# Patient Record
Sex: FEMALE
[De-identification: names, ages, dates, MRNs, and addresses within clinical notes are randomized per-mention and may not be internally consistent; named-entity substitution may affect disease eponyms.]

---

## 2021-11-02 PROBLEM — Z00.00 ENCOUNTER FOR PREVENTIVE HEALTH EXAMINATION: Status: ACTIVE | Noted: 2021-11-02

## 2021-12-14 ENCOUNTER — APPOINTMENT (OUTPATIENT)
Dept: ENDOCRINOLOGY | Facility: CLINIC | Age: 61
End: 2021-12-14
Payer: COMMERCIAL

## 2021-12-14 ENCOUNTER — TRANSCRIPTION ENCOUNTER (OUTPATIENT)
Age: 61
End: 2021-12-14

## 2021-12-14 VITALS
SYSTOLIC BLOOD PRESSURE: 120 MMHG | DIASTOLIC BLOOD PRESSURE: 70 MMHG | HEIGHT: 61 IN | BODY MASS INDEX: 30.21 KG/M2 | WEIGHT: 160 LBS | HEART RATE: 74 BPM

## 2021-12-14 DIAGNOSIS — Z78.9 OTHER SPECIFIED HEALTH STATUS: ICD-10-CM

## 2021-12-14 DIAGNOSIS — Z80.9 FAMILY HISTORY OF MALIGNANT NEOPLASM, UNSPECIFIED: ICD-10-CM

## 2021-12-14 DIAGNOSIS — Z86.39 PERSONAL HISTORY OF OTHER ENDOCRINE, NUTRITIONAL AND METABOLIC DISEASE: ICD-10-CM

## 2021-12-14 DIAGNOSIS — Z87.39 PERSONAL HISTORY OF OTHER DISEASES OF THE MUSCULOSKELETAL SYSTEM AND CONNECTIVE TISSUE: ICD-10-CM

## 2021-12-14 DIAGNOSIS — Z63.4 DISAPPEARANCE AND DEATH OF FAMILY MEMBER: ICD-10-CM

## 2021-12-14 DIAGNOSIS — Z85.3 PERSONAL HISTORY OF MALIGNANT NEOPLASM OF BREAST: ICD-10-CM

## 2021-12-14 PROCEDURE — 99204 OFFICE O/P NEW MOD 45 MIN: CPT

## 2021-12-14 RX ORDER — DENOSUMAB 60 MG/ML
60 INJECTION SUBCUTANEOUS
Refills: 0 | Status: ACTIVE | COMMUNITY

## 2021-12-14 SDOH — SOCIAL STABILITY - SOCIAL INSECURITY: DISSAPEARANCE AND DEATH OF FAMILY MEMBER: Z63.4

## 2022-12-14 ENCOUNTER — APPOINTMENT (OUTPATIENT)
Dept: ENDOCRINOLOGY | Facility: CLINIC | Age: 62
End: 2022-12-14

## 2023-01-18 ENCOUNTER — RX RENEWAL (OUTPATIENT)
Age: 63
End: 2023-01-18

## 2023-04-12 ENCOUNTER — APPOINTMENT (OUTPATIENT)
Dept: ENDOCRINOLOGY | Facility: CLINIC | Age: 63
End: 2023-04-12
Payer: COMMERCIAL

## 2023-04-12 VITALS
SYSTOLIC BLOOD PRESSURE: 118 MMHG | DIASTOLIC BLOOD PRESSURE: 76 MMHG | HEIGHT: 61 IN | WEIGHT: 163 LBS | BODY MASS INDEX: 30.78 KG/M2 | HEART RATE: 79 BPM | OXYGEN SATURATION: 98 %

## 2023-04-12 PROCEDURE — 99214 OFFICE O/P EST MOD 30 MIN: CPT | Mod: 25

## 2023-04-12 PROCEDURE — 36415 COLL VENOUS BLD VENIPUNCTURE: CPT

## 2023-04-14 LAB
25(OH)D3 SERPL-MCNC: 37.2 NG/ML
ALBUMIN SERPL ELPH-MCNC: 4.5 G/DL
ALP BLD-CCNC: 59 U/L
ALT SERPL-CCNC: 11 U/L
ANION GAP SERPL CALC-SCNC: 11 MMOL/L
AST SERPL-CCNC: 16 U/L
BILIRUB SERPL-MCNC: 0.2 MG/DL
BUN SERPL-MCNC: 17 MG/DL
CALCIUM SERPL-MCNC: 9.6 MG/DL
CALCIUM SERPL-MCNC: 9.6 MG/DL
CHLORIDE SERPL-SCNC: 100 MMOL/L
CO2 SERPL-SCNC: 25 MMOL/L
CREAT SERPL-MCNC: 0.77 MG/DL
EGFR: 87 ML/MIN/1.73M2
GLUCOSE SERPL-MCNC: 103 MG/DL
PARATHYROID HORMONE INTACT: 66 PG/ML
POTASSIUM SERPL-SCNC: 4.3 MMOL/L
PROT SERPL-MCNC: 7 G/DL
SODIUM SERPL-SCNC: 136 MMOL/L
T4 FREE SERPL-MCNC: 1.8 NG/DL
TSH SERPL-ACNC: 1.29 UIU/ML

## 2023-11-01 ENCOUNTER — NON-APPOINTMENT (OUTPATIENT)
Age: 63
End: 2023-11-01

## 2024-04-14 ENCOUNTER — RX RENEWAL (OUTPATIENT)
Age: 64
End: 2024-04-14

## 2024-04-17 ENCOUNTER — APPOINTMENT (OUTPATIENT)
Dept: ENDOCRINOLOGY | Facility: CLINIC | Age: 64
End: 2024-04-17
Payer: COMMERCIAL

## 2024-04-17 VITALS
OXYGEN SATURATION: 98 % | HEIGHT: 61 IN | SYSTOLIC BLOOD PRESSURE: 110 MMHG | HEART RATE: 94 BPM | BODY MASS INDEX: 32.1 KG/M2 | DIASTOLIC BLOOD PRESSURE: 60 MMHG | WEIGHT: 170 LBS

## 2024-04-17 DIAGNOSIS — Z13.1 ENCOUNTER FOR SCREENING FOR DIABETES MELLITUS: ICD-10-CM

## 2024-04-17 DIAGNOSIS — E55.9 VITAMIN D DEFICIENCY, UNSPECIFIED: ICD-10-CM

## 2024-04-17 DIAGNOSIS — M81.0 AGE-RELATED OSTEOPOROSIS W/OUT CURRENT PATHOLOGICAL FRACTURE: ICD-10-CM

## 2024-04-17 DIAGNOSIS — R73.01 IMPAIRED FASTING GLUCOSE: ICD-10-CM

## 2024-04-17 DIAGNOSIS — E03.9 HYPOTHYROIDISM, UNSPECIFIED: ICD-10-CM

## 2024-04-17 DIAGNOSIS — E21.3 HYPERPARATHYROIDISM, UNSPECIFIED: ICD-10-CM

## 2024-04-17 LAB
HBA1C MFR BLD HPLC: 5
TSH SERPL-ACNC: 0.61

## 2024-04-17 PROCEDURE — 99214 OFFICE O/P EST MOD 30 MIN: CPT

## 2024-04-17 RX ORDER — LEVOTHYROXINE SODIUM 0.12 MG/1
125 TABLET ORAL
Qty: 90 | Refills: 3 | Status: ACTIVE | COMMUNITY
Start: 2023-01-18 | End: 1900-01-01

## 2024-04-17 NOTE — HISTORY OF PRESENT ILLNESS
[FreeTextEntry1] : here for hypothyroidism, osteoporosis left breast cancer s/p lumpectomy and XRT (no chemo) 2003 but now in remission  thyroid hx: 2003 thyroid ultrasound: 1/2021  no nodules FH of thyroid issue: maternal aunt FH of thyroid cancer: none FH of diabetes: father external beam radiation: to left breast  menopause: 53yo  interval history feels okay labs reviewd- pth 66/ normal calcium, vit d 37 has 4 grandkids, 1.4 yo and 1.6yo and 3 and 6yo 5th granddaughter coming in 2 weeks going to [employer] this sunday with 3 kids and oldest  meds for thyroid: LT4 125mcg  takes appropriately, no missing doses  on prolia since 2019, Dr. Scott Jack, rheumatologist no falls or fractures had b/l hip replacements taking calcium/vitamin d gummies mother and gma both osteoporosis

## 2024-04-17 NOTE — ASSESSMENT
[FreeTextEntry1] : 63F w/ hx of left breast cancer s/p lumpectomy and XRT, hypothyroidism, IFG and osteoporosis/ vitamin d def here for follow up rtc in 1 year with labs pmd Dr. Ian Masters pth slightly elevated possibly related with the vitamin d   Hypothyroidism- TFTs normal. continue LT4 125mcg daily.  Osteoporosis, vitamin d def- on prolia since 2019. reported that bmd improved. seeing rheumatologist. bmd report pending  IFG- check a1c next year

## 2024-04-17 NOTE — PHYSICAL EXAM
[Alert] : alert [Well Nourished] : well nourished [No Acute Distress] : no acute distress [Well Developed] : well developed [Normal Sclera/Conjunctiva] : normal sclera/conjunctiva [EOMI] : extra ocular movement intact [No Proptosis] : no proptosis [Normal Oropharynx] : the oropharynx was normal [Thyroid Not Enlarged] : the thyroid was not enlarged [No Thyroid Nodules] : no palpable thyroid nodules [No Respiratory Distress] : no respiratory distress [No Accessory Muscle Use] : no accessory muscle use [Clear to Auscultation] : lungs were clear to auscultation bilaterally [Normal S1, S2] : normal S1 and S2 [Normal Rate] : heart rate was normal [Regular Rhythm] : with a regular rhythm [Normal Bowel Sounds] : normal bowel sounds [Not Tender] : non-tender [Not Distended] : not distended [Soft] : abdomen soft [No Stigmata of Cushings Syndrome] : no stigmata of Cushings Syndrome [No Rash] : no rash [Normal Reflexes] : deep tendon reflexes were 2+ and symmetric [No Tremors] : no tremors [Oriented x3] : oriented to person, place, and time [Normal Affect] : the affect was normal [Normal Mood] : the mood was normal [Acanthosis Nigricans] : no acanthosis nigricans

## 2025-04-29 ENCOUNTER — APPOINTMENT (OUTPATIENT)
Dept: ENDOCRINOLOGY | Facility: CLINIC | Age: 65
End: 2025-04-29

## 2025-08-13 ENCOUNTER — OFFICE (OUTPATIENT)
Dept: URBAN - METROPOLITAN AREA CLINIC 112 | Facility: CLINIC | Age: 65
Setting detail: OPHTHALMOLOGY
End: 2025-08-13
Payer: COMMERCIAL

## 2025-08-13 DIAGNOSIS — H25.13: ICD-10-CM

## 2025-08-13 DIAGNOSIS — H11.153: ICD-10-CM

## 2025-08-13 PROBLEM — S05.02X CORNEAL ABRASION ; RIGHT EYE: Status: RESOLVED | Noted: 2025-08-13 | Resolved: 2025-08-13

## 2025-08-13 PROCEDURE — 92014 COMPRE OPH EXAM EST PT 1/>: CPT | Performed by: OPHTHALMOLOGY

## 2025-08-13 ASSESSMENT — REFRACTION_MANIFEST
OD_CYLINDER: -0.50
OS_ADD: +2.25
OS_SPHERE: PL
OD_VA1: 20/20
OS_CYLINDER: -0.75
OD_ADD: +2.25
OD_SPHERE: -0.50
OS_AXIS: 90
OD_AXIS: 100
OS_VA1: 20/20

## 2025-08-13 ASSESSMENT — REFRACTION_AUTOREFRACTION
OD_SPHERE: -0.50
OD_AXIS: 100
OD_CYLINDER: -0.50
OS_CYLINDER: -1.00
OS_AXIS: 088
OS_SPHERE: +0.25

## 2025-08-13 ASSESSMENT — REFRACTION_CURRENTRX
OS_VPRISM_DIRECTION: SV
OS_CYLINDER: +0.75
OD_CYLINDER: +0.75
OD_SPHERE: -3.00
OS_OVR_VA: 20/
OS_SPHERE: -2.75
OD_VPRISM_DIRECTION: SV
OD_AXIS: 007
OS_AXIS: 138
OD_OVR_VA: 20/

## 2025-08-13 ASSESSMENT — LID EXAM ASSESSMENTS: OD_EDEMA: ABSENT

## 2025-08-13 ASSESSMENT — VISUAL ACUITY
OD_BCVA: 20/25-
OS_BCVA: 20/20

## 2025-08-13 ASSESSMENT — KERATOMETRY
OS_K2POWER_DIOPTERS: 47.00
OD_AXISANGLE_DEGREES: 155
OS_AXISANGLE_DEGREES: 090
OS_K1POWER_DIOPTERS: 47.00
OD_K1POWER_DIOPTERS: 46.25
OD_K2POWER_DIOPTERS: 47.25
METHOD_AUTO_MANUAL: AUTO

## 2025-08-13 ASSESSMENT — TONOMETRY
OD_IOP_MMHG: 19
OS_IOP_MMHG: 20

## 2025-08-13 ASSESSMENT — CORNEAL TRAUMA: OD_TRAUMA: HEALING ABRASION

## 2025-08-13 ASSESSMENT — CORNEAL TRAUMA - ABRASION: OD_ABRASION: PRESENT
